# Patient Record
Sex: MALE | Race: WHITE | NOT HISPANIC OR LATINO | URBAN - METROPOLITAN AREA
[De-identification: names, ages, dates, MRNs, and addresses within clinical notes are randomized per-mention and may not be internally consistent; named-entity substitution may affect disease eponyms.]

---

## 2023-03-09 ENCOUNTER — EMERGENCY (EMERGENCY)
Facility: HOSPITAL | Age: 23
LOS: 1 days | Discharge: HOME | End: 2023-03-09
Attending: EMERGENCY MEDICINE
Payer: COMMERCIAL

## 2023-03-09 VITALS
DIASTOLIC BLOOD PRESSURE: 75 MMHG | RESPIRATION RATE: 18 BRPM | HEART RATE: 84 BPM | TEMPERATURE: 98 F | HEIGHT: 70 IN | OXYGEN SATURATION: 95 % | SYSTOLIC BLOOD PRESSURE: 130 MMHG | WEIGHT: 179.9 LBS

## 2023-03-09 VITALS
RESPIRATION RATE: 20 BRPM | OXYGEN SATURATION: 100 % | HEART RATE: 77 BPM | DIASTOLIC BLOOD PRESSURE: 72 MMHG | SYSTOLIC BLOOD PRESSURE: 170 MMHG

## 2023-03-09 RX ORDER — DEXAMETHASONE 0.5 MG/5ML
10 ELIXIR ORAL ONCE
Refills: 0 | Status: COMPLETED | OUTPATIENT
Start: 2023-03-09 | End: 2023-03-09

## 2023-03-09 RX ORDER — KETOROLAC TROMETHAMINE 30 MG/ML
30 SYRINGE (ML) INJECTION ONCE
Refills: 0 | Status: DISCONTINUED | OUTPATIENT
Start: 2023-03-09 | End: 2023-03-09

## 2023-03-09 RX ORDER — IBUPROFEN 200 MG
1 TABLET ORAL
Qty: 15 | Refills: 0
Start: 2023-03-09 | End: 2023-03-13

## 2023-03-09 RX ORDER — CYCLOBENZAPRINE HYDROCHLORIDE 10 MG/1
10 TABLET, FILM COATED ORAL ONCE
Refills: 0 | Status: COMPLETED | OUTPATIENT
Start: 2023-03-09 | End: 2023-03-09

## 2023-03-09 RX ADMIN — CYCLOBENZAPRINE HYDROCHLORIDE 10 MILLIGRAM(S): 10 TABLET, FILM COATED ORAL at 12:05

## 2023-03-09 RX ADMIN — Medication 30 MILLIGRAM(S): at 11:38

## 2023-03-09 RX ADMIN — Medication 8 MILLIGRAM(S): at 12:05

## 2023-03-09 NOTE — ED PROVIDER NOTE - OBJECTIVE STATEMENT
Patient is a 22-year-old male with no significant past medical history presenting with shoulder injury.  Patient notes he was standing underneath a car that was on the left when the car slipped off the left and landed on his left shoulder.  Patient complaining of left shoulder pain and elbow pain.  Denies any head trauma despite triage note.  No midline neck pain or back pain.  No chest pain or shortness of breath.  Denies any focal weakness or numbness.  No medications prior to arrival.  No abdominal pain.  No open lacerations, bleeding.  No other trauma noted

## 2023-03-09 NOTE — ED PROVIDER NOTE - MUSCULOSKELETAL, MLM
Spine appears normal, range of motion is not limited, no muscle or joint tenderness.  full range of motion of bilateral upper extremities at all points.

## 2023-03-09 NOTE — ED PROVIDER NOTE - NEUROLOGICAL, MLM
Alert and oriented, no focal deficits, no motor or sensory deficits. CN 2-12 intact, No tremors. No fasciculations. No nystagmus. Normal finger to nose and heel to shin b/l. No dysmetria.  Normal gait. no spinal ttp

## 2023-03-09 NOTE — ED PROVIDER NOTE - CLINICAL SUMMARY MEDICAL DECISION MAKING FREE TEXT BOX
Patient presenting status post injury to left posterior shoulder.  Full range of motion of shoulder.  No neurologic complaints.  No head trauma.  No spinal pain pain or tenderness.  Exam remarkable for abrasion to posterior shoulder though again full range of motion is noted Will obtain x-ray of shoulder pain control likely discharge with outpatient follow-up.  Patient initially called code trauma B though did not meet criteria and code trauma B has been

## 2023-03-09 NOTE — ED PROVIDER NOTE - PATIENT PORTAL LINK FT
You can access the FollowMyHealth Patient Portal offered by [facility] by registering at the following website: [email or web address]. By joining [facility]’s FollowMyHealth portal, you will also be able to view your health information using other applications (apps) compatible with our system.

## 2023-03-09 NOTE — ED ADULT TRIAGE NOTE - CHIEF COMPLAINT QUOTE
pt states he was at work & truck fell on me, c/o pain to left elbow, left shoulder & back of head, no LOC  A&Ox3, resp wnl, + abrasion to left elbow, denies any LOC pt states he was at work & truck fell on me, c/o pain to left elbow, left shoulder & back of head, no LOC  A&Ox3, resp wnl, + abrasion to left elbow, + abrasion to left shoulder + lump to back of head denies any LOC pt states he was at work & truck fell on me, c/o pain to left elbow, left shoulder & back of head, no LOC  A&Ox3, resp wnl, + abrasion to left elbow, + abrasion to left shoulder + lump to back of head , c/o neck pain denies any LOC

## 2023-03-09 NOTE — ED PROVIDER NOTE - PROGRESS NOTE DETAILS
AJM: pt feeling improved. workup neg. stable for dc. All results discussed with the patient, and a copy of results has been provided. Patient is comfortable with dc plan for home. Opportunity for questions was provided and all questions have been addressed. Patient understands when to return to ED if symptoms worsen.

## 2023-03-09 NOTE — ED ADULT NURSE NOTE - CHIEF COMPLAINT QUOTE
pt states he was at work & truck fell on me, c/o pain to left elbow, left shoulder & back of head, no LOC  A&Ox3, resp wnl, + abrasion to left elbow, + abrasion to left shoulder + lump to back of head , c/o neck pain denies any LOC

## 2023-03-09 NOTE — ED ADULT NURSE NOTE - OBJECTIVE STATEMENT
Patient arrived to ED today from work after a car fell partially off a lift and into his left upper back, left arm.  Code trauma B called from walk-in triage RN.  Patient c/o left arm pain, left upper back pain.  Patient denies head injury, no LOC.  WILSON, no loss of sensation, abrasion to left arm, left upper back.  Patient reports no other injuries or complaints.